# Patient Record
(demographics unavailable — no encounter records)

---

## 2025-06-06 NOTE — PHYSICAL EXAM
[Ankle Swelling (On Exam)] : not present [Varicose Veins Of Lower Extremities] : bilaterally [Ankle Swelling On The Left] : moderate [Abdomen Tenderness] : ~T ~M No abdominal tenderness [de-identified] : WN/WD, NC/AT [de-identified] : NC/AT [de-identified] : FROM 5/5x4 throughout, strength 5/5x4 [de-identified] : grossly intact

## 2025-06-06 NOTE — ADDENDUM
[FreeTextEntry1] : This note was written by Josefina CARRERA, acting as a scribe for Dr. Daniel Palacios.  I, Dr. Daniel Palacios, have read and attest that all the information, medical decision-making, and discharge instructions within are true and accurate.  I, Dr. Daniel Palacios, personally performed the evaluation and management (E/M) services for this new patient.  That E/M includes conducting the initial examination, assessing all conditions, and establishing the plan of care.  Today, my ACP, Josefina CARRERA, was here to observe my evaluation and management services for this patient to be followed going forward.

## 2025-06-06 NOTE — HISTORY OF PRESENT ILLNESS
[FreeTextEntry1] : 35yoF with no significant PMHx who is referred by Dr. Nye to be evaluated for varicose veins. Patient states that over the years her varicose veins became more pronounced and now they cause some discomfort. She denies prior hx of DVT/SVT, previous venous procedures, claudication, legs pain, skin discoloration or breakdown.

## 2025-06-06 NOTE — ASSESSMENT
[FreeTextEntry1] : 35yoF with no significant PMHx who is referred by Dr. Nye to be evaluated for varicose veins. On exam, both legs are well perfused, no edema, varicose veins noted. BL LEs venous doppler was performed in the office that demonstrated no evidence of DVT/SVT, no GSV/SSV reflux. We discussed the findings and explained that no vascular intervention is needed. If she would like to achieve cosmetic results, she was recommended to see Dr. Anguiano who specializing in venous treatment.

## 2025-06-06 NOTE — PROCEDURE
[FreeTextEntry1] : BL LEs venous doppler was performed in the office that demonstrated no evidence of DVT/SVT, no GSV/SSV reflux